# Patient Record
Sex: FEMALE | Employment: OTHER | URBAN - METROPOLITAN AREA
[De-identification: names, ages, dates, MRNs, and addresses within clinical notes are randomized per-mention and may not be internally consistent; named-entity substitution may affect disease eponyms.]

---

## 2017-01-24 ENCOUNTER — OFFICE VISIT (OUTPATIENT)
Dept: FAMILY MEDICINE | Facility: CLINIC | Age: 27
End: 2017-01-24

## 2017-01-24 VITALS
RESPIRATION RATE: 14 BRPM | HEIGHT: 61 IN | HEART RATE: 90 BPM | WEIGHT: 165.2 LBS | OXYGEN SATURATION: 99 % | BODY MASS INDEX: 31.19 KG/M2 | DIASTOLIC BLOOD PRESSURE: 81 MMHG | TEMPERATURE: 98.4 F | SYSTOLIC BLOOD PRESSURE: 110 MMHG

## 2017-01-24 DIAGNOSIS — Z00.00 ROUTINE GENERAL MEDICAL EXAMINATION AT A HEALTH CARE FACILITY: Primary | ICD-10-CM

## 2017-01-24 DIAGNOSIS — E04.9 ENLARGEMENT OF THYROID: ICD-10-CM

## 2017-01-24 LAB
BUN SERPL-MCNC: 11.1 MG/DL (ref 7–19)
CALCIUM SERPL-MCNC: 9.3 MG/DL (ref 8.5–10.1)
CHLORIDE SERPLBLD-SCNC: 102.3 MMOL/L (ref 98–110)
CHOLEST SERPL-MCNC: 214.5 MG/DL (ref 0–200)
CHOLEST/HDLC SERPL: 4 {RATIO} (ref 0–5)
CO2 SERPL-SCNC: 22.5 MMOL/L (ref 20–32)
CREAT SERPL-MCNC: 0.6 MG/DL (ref 0.5–1)
GFR SERPL CREATININE-BSD FRML MDRD: 127.5 ML/MIN/1.7 M2
GLUCOSE SERPL-MCNC: 101 MG'DL (ref 70–99)
HBA1C MFR BLD: 5.5 % (ref 4.1–5.7)
HCT VFR BLD AUTO: 45 % (ref 35–47)
HDLC SERPL-MCNC: 53.9 MG/DL
HEMOGLOBIN: 14.5 G/DL (ref 11.7–15.7)
LDLC SERPL CALC-MCNC: 138 MG/DL (ref 0–129)
MCH RBC QN AUTO: 28.2 PG (ref 26.5–35)
MCHC RBC AUTO-ENTMCNC: 32.2 G/DL (ref 32–36)
MCV RBC AUTO: 87.6 FL (ref 78–100)
PLATELET # BLD AUTO: 260 K/UL (ref 150–450)
POTASSIUM SERPL-SCNC: 3.7 MMOL/DL (ref 3.3–4.5)
RBC # BLD AUTO: 5.14 M/UL (ref 3.8–5.2)
SODIUM SERPL-SCNC: 135.3 MMOL/L (ref 132.6–141.4)
TRIGL SERPL-MCNC: 111.6 MG/DL (ref 0–150)
TSH SERPL DL<=0.005 MIU/L-ACNC: 1.69 MU/L (ref 0.4–4)
VLDL CHOLESTEROL: 22.3 MG/DL (ref 7–32)
WBC # BLD AUTO: 4.6 K/UL (ref 4–11)

## 2017-01-24 NOTE — PROGRESS NOTES
Female Physical Note          HPI         Concerns today:   Bump: Reports she was bump on her neck for the past 4 days. Denies any pain or discomfort. She is concern if this cancer or anything dangerous. Denies any family history of thyroid cancer or thyroid disorder    There is no problem list on file for this patient.      History reviewed. No pertinent past medical history.    Previous Medical Care: [facility]      History reviewed. No pertinent family history.           Review of Systems:     Review of Systems:  CONSTITUTIONAL: NEGATIVE for fever, chills, change in weight  INTEGUMENTARY/SKIN: NEGATIVE for worrisome rashes, moles or lesions  EYES: NEGATIVE for vision changes or irritation  ENT/MOUTH: NEGATIVE for ear, mouth and throat problems  RESP: NEGATIVE for significant cough or SOB  BREAST: NEGATIVE for masses, tenderness or discharge  CV: NEGATIVE for chest pain, palpitations or peripheral edema  GI: NEGATIVE for nausea, abdominal pain, heartburn, or change in bowel habits  : NEGATIVE for frequency, dysuria, or hematuria  MUSCULOSKELETAL: NEGATIVE for significant arthralgias or myalgia  NEURO: NEGATIVE for weakness, dizziness or paresthesias  ENDOCRINE: NEGATIVE for temperature intolerance, skin/hair changes  HEME/ALLERGY: NEGATIVE for bleeding problems  PSYCHIATRIC: NEGATIVE for changes in mood or affect    Sleep:   Do you snore most or the night (as reported by a family member)? No  Do you feel sleepy or extremely tired during most of the day? No           Social History     Social History     Social History     Marital Status:      Spouse Name: N/A     Number of Children: N/A     Years of Education: N/A     Occupational History     Not on file.     Social History Main Topics     Smoking status: Never Smoker      Smokeless tobacco: Not on file     Alcohol Use: No     Drug Use: No     Sexual Activity: Not Currently     Other Topics Concern     Not on file     Social History Narrative  "    No narrative on file       Marital Status:  Who lives in your household? Lives with her two children    Has anyone hurt you physically, for example by pushing, hitting, slapping or kicking you or forcing you to have sex? Denies  Do you feel threatened or controlled by a partner, ex-partner or anyone in your life? Denies    Sexual Health     Sexual concerns: No   STI History: Neg  Pregnancy History: No obstetric history on file.  LMP Patient's last menstrual period was 2017. Menses: q 28 days  Lastin days,  Normal  Last Pap Smear Date: No results found for this basename: PAP  Abnormal Pap History: None    Recommended Screening     None          Physical Exam:     Vitals: /81 mmHg  Pulse 90  Temp(Src) 98.4  F (36.9  C) (Oral)  Resp 14  Ht 5' 0.63\" (154 cm)  Wt 165 lb 3.2 oz (74.934 kg)  BMI 31.60 kg/m2  SpO2 99%  LMP 2017  BMI= Body mass index is 31.6 kg/(m^2).   GENERAL: healthy, alert and no distress  EYES: Eyes grossly normal to inspection, extraocular movements - intact, and PERRL  HENT: ear canals- normal; TMs- normal; Nose- normal; Mouth- no ulcers, no lesions  NECK: no adenopathy and thyromegaly approximately 2 times normal  RESP: lungs clear to auscultation - no rales, no rhonchi, no wheezes  BREAST: no masses, no tenderness, no nipple discharge, no palpable axillary masses or adenopathy  CV: regular rates and rhythm, normal S1 S2, no S3 or S4 and no murmur, no click or rub -  ABDOMEN: soft, no tenderness, no  hepatosplenomegaly, no masses, normal bowel sounds  MS: extremities- no gross deformities noted, no edema  SKIN: no suspicious lesions, no rashes  NEURO: strength and tone- normal, sensory exam- grossly normal, mentation- intact, speech- normal, reflexes- symmetric  BACK: no CVA tenderness, no paralumbar tenderness  - female: cervix- normal, adnexae- normal; uterus- normal, no masses, no discharge  RECTAL- female: no masses, no hemorrhoids  PSYCH: Alert and " oriented times 3; speech- coherent , normal rate and volume; able to articulate logical thoughts, able to abstract reason, no tangential thoughts, no hallucinations or delusions, affect- normal  LYMPHATICS: ant. cervical- normal, post. cervical- normal, axillary- normal, supraclavicular- normal, inguinal- normal      Assessment and Plan      Milagro was seen today for physical and throat problem.    Diagnoses and all orders for this visit:    Routine general medical examination at a health care facility  -     Hemoglobin A1c (LabDAQ)  -     TSH with free T4 reflex  -     CBC with Plt (LabDAQ)  -     Lipid Panel (LabDAQ)  -     Basic Metabolic Panel (LabDAQ)    Enlargement of thyroid  -     US Head Neck Soft Tissue; Future        Options for treatment and follow-up care were reviewed with the patient . Milgaro Akin and/or guardian engaged in the decision making process and verbalized understanding of the options discussed and agreed with the final plan.    Dane Darling MD  PGY 3 [facility]  [phone](pg)

## 2017-01-24 NOTE — NURSING NOTE
name: Za Lynch  Language: Colombian  Agency: [employer]  Phone number: [phone]      TRACY Webster

## 2017-01-24 NOTE — MR AVS SNAPSHOT
After Visit Summary   1/24/2017    Milagro Gerardo    MRN: [id number]           Patient Information     Date Of Birth          1990        Visit Information        Provider Department      1/24/2017 1:40 PM Dane Darling MD [facility]        Today's Diagnoses     Routine general medical examination at a health care facility    -  1     Enlargement of thyroid           Care Instructions    Here is the plan from today's visit    1. Routine general medical examination at a health care facility  - Hemoglobin A1c (LabDAQ)  - TSH with free T4 reflex  - CBC with Plt (LabDAQ)  - Lipid Panel (LabDAQ)  - Basic Metabolic Panel (LabDAQ)    2. Enlargement of thyroid  - US Head Neck Soft Tissue; Future        Please call or return to clinic if your symptoms don't go away.    Follow up plan  Follow up as needed    Thank you for coming to [facility] today.  Lab Testing:  **If you had lab testing today and your results are reassuring or normal they will be mailed to you or sent through [employer] within 7 days.   **If the lab tests need quick action we will call you with the results.  The phone number we will call with results is # [phone] (home) . If this is not the best number please call our clinic and change the number.  Medication Refills:  If you need any refills please call your pharmacy and they will contact us.   If you need to  your refill at a new pharmacy, please contact the new pharmacy directly. The new pharmacy will help you get your medications transferred faster.   Scheduling:  If you have any concerns about today's visit or wish to schedule another appointment please call our office during normal business hours [phone] (8-5:00 M-F)  If a referral was made to a [facility] and you don't get a call from central scheduling please call [phone].  If a Mammogram was ordered for you at [facility] call [phone] to schedule or  change your appointment.  If you had an XRay/CT/Ultrasound/MRI ordered the number is [phone] to schedule or change your radiology appointment.   Medical Concerns:  If you have urgent medical concerns please call [phone] at any time of the day.  If you have a medical emergency please call 151.    Preventive Health Recommendations  Female Ages 26 - 39  Yearly exam:   See your health care provider every year in order to    Review health changes.     Discuss preventive care.      Review your medicines if you your doctor has prescribed any.    Until age 30: Get a Pap test every three years (more often if you have had an abnormal result).    After age 30: Talk to your doctor about whether you should have a Pap test every 3 years or have a Pap test with HPV screening every 5 years.   You do not need a Pap test if your uterus was removed (hysterectomy) and you have not had cancer.  You should be tested each year for STDs (sexually transmitted diseases), if you're at risk.   Talk to your provider about how often to have your cholesterol checked.  If you are at risk for diabetes, you should have a diabetes test (fasting glucose).  Shots: Get a flu shot each year. Get a tetanus shot every 10 years.   Nutrition:     Eat at least 5 servings of fruits and vegetables each day.    Eat whole-grain bread, whole-wheat pasta and brown rice instead of white grains and rice.    Talk to your provider about Calcium and Vitamin D.     Lifestyle    Exercise at least 150 minutes a week (30 minutes a day, 5 days of the week). This will help you control your weight and prevent disease.    Limit alcohol to one drink per day.    No smoking.     Wear sunscreen to prevent skin cancer.    See your dentist every six months for an exam and cleaning.            Follow-ups after your visit        Future tests that were ordered for you today     Open Future Orders        Priority Expected Expires Ordered    US Head Neck Soft Tissue Routine   "2018            Who to contact     Please call your clinic at [phone] to:    Ask questions about your health    Make or cancel appointments    Discuss your medicines    Learn about your test results    Speak to your doctor   If you have compliments or concerns about an experience at your clinic, or if you wish to file a complaint, please contact [facility] Patient Relations at [phone] or email us at [email or web address]         Additional Information About Your Visit        Gal[employer] Digital Information     [employer] is an electronic gateway that provides easy, online access to your medical records. With [employer], you can request a clinic appointment, read your test results, renew a prescription or communicate with your care team.     To sign up for [employer] visit the website at [email or web address]   You will be asked to enter the access code listed below, as well as some personal information. Please follow the directions to create your username and password.     Your access code is: [email or web address]  Expires: 2017  2:28 PM     Your access code will  in 90 days. If you need help or a new code, please contact your [facility] or call [phone] for assistance.        Care EveryWhere ID     This is your Care EveryWhere ID. This could be used by other organizations to access your [place] medical records  [id number]        Your Vitals Were     Pulse Temperature Respirations Height BMI (Body Mass Index) Pulse Oximetry    90 98.4  F (36.9  C) (Oral) 14 5' 0.63\" (154 cm) 31.60 kg/m2 99%    Last Period                   2017            Blood Pressure from Last 3 Encounters:   17 110/81    Weight from Last 3 Encounters:   17 165 lb 3.2 oz (74.934 kg)              We Performed the Following     Basic Metabolic Panel (LabDAQ)     CBC with Plt (LabDAQ)     Hemoglobin A1c (LabDAQ)     Lipid Panel (LabDAQ)     TSH " with free T4 reflex        Primary Care Provider Office Phone # Fax #    Dane Darling [phone]       [facility] [address]  [facility] [address]        Thank you!     Thank you for choosing [facility]  for your care. Our goal is always to provide you with excellent care. Hearing back from our patients is one way we can continue to improve our services. Please take a few minutes to complete the written survey that you may receive in the mail after your visit with us. Thank you!             Your Updated Medication List - Protect others around you: Learn how to safely use, store and throw away your medicines at www.disposemymeds.org.      Notice  As of 1/24/2017  2:28 PM    You have not been prescribed any medications.

## 2017-01-24 NOTE — PROGRESS NOTES
Preceptor Attestation:   Patient seen and discussed with the resident. Assessment and plan reviewed with resident and agreed upon.   Supervising Physician:  Ysabel Navarro MD  [facility]

## 2017-01-25 ENCOUNTER — HOSPITAL ENCOUNTER (OUTPATIENT)
Dept: ULTRASOUND IMAGING | Facility: CLINIC | Age: 27
Discharge: HOME | End: 2017-01-25
Attending: FAMILY MEDICINE | Admitting: FAMILY MEDICINE
Payer: COMMERCIAL

## 2017-01-25 ENCOUNTER — TELEPHONE (OUTPATIENT)
Dept: FAMILY MEDICINE | Facility: CLINIC | Age: 27
End: 2017-01-25

## 2017-01-25 DIAGNOSIS — E04.9 ENLARGEMENT OF THYROID: ICD-10-CM

## 2017-01-25 PROCEDURE — 76536 US EXAM OF HEAD AND NECK: CPT

## 2017-01-25 NOTE — TELEPHONE ENCOUNTER
Please call the patient to schedule an appointment to discuss lab results and ultrasound results.      Dane Darling MD  PGY 3 [facility]  [phone](pg)

## 2017-01-26 NOTE — TELEPHONE ENCOUNTER
Called and spoke with patient. Informed patient that Dr. Darling does not have appointment open today but that she could see Dr. Darling tomorrow or a different provider could see her today. Patient stated she will think about it and call back today to schedule.

## 2017-01-27 ENCOUNTER — OFFICE VISIT (OUTPATIENT)
Dept: FAMILY MEDICINE | Facility: CLINIC | Age: 27
End: 2017-01-27

## 2017-01-27 VITALS
DIASTOLIC BLOOD PRESSURE: 82 MMHG | TEMPERATURE: 98.2 F | WEIGHT: 167.4 LBS | RESPIRATION RATE: 20 BRPM | HEART RATE: 8 BPM | BODY MASS INDEX: 32.02 KG/M2 | SYSTOLIC BLOOD PRESSURE: 117 MMHG | OXYGEN SATURATION: 100 %

## 2017-01-27 DIAGNOSIS — E04.2 NONTOXIC MULTINODULAR GOITER: Primary | ICD-10-CM

## 2017-01-27 DIAGNOSIS — E78.5 HYPERLIPIDEMIA LDL GOAL <100: ICD-10-CM

## 2017-01-27 RX ORDER — ACETAMINOPHEN 160 MG
2000 TABLET,DISINTEGRATING ORAL
COMMUNITY
Start: 2014-05-16

## 2017-01-27 ASSESSMENT — ENCOUNTER SYMPTOMS
FATIGUE: 0
SHORTNESS OF BREATH: 0
FEVER: 0
ENDOCRINE COMMENTS: THYROID NODULES
COUGH: 0

## 2017-01-27 NOTE — MR AVS SNAPSHOT
After Visit Summary   1/27/2017    Milagro Gerardo    MRN: [id number]           Patient Information     Date Of Birth          1990        Visit Information        Provider Department      1/27/2017 9:00 AM Dane Darling MD [facility]        Today's Diagnoses     Nontoxic multinodular goiter    -  1     Hyperlipidemia LDL goal <100           Care Instructions    Here is the plan from today's visit    1. Nontoxic multinodular goiter  -Schedule an appointment with endocrinology and will need possible fine needle biopsy   - ENDOCRINOLOGY ADULT REFERRAL - INTERNAL    2. Hyperlipidemia LDL goal <100  _Encouarged lifestyle modifications such as exercise and diet       Please call or return to clinic if your symptoms don't go away.    Follow up plan  Follow up as needed    Thank you for coming to [facility] today.  Lab Testing:  **If you had lab testing today and your results are reassuring or normal they will be mailed to you or sent through [employer] within 7 days.   **If the lab tests need quick action we will call you with the results.  The phone number we will call with results is # [phone] (home) . If this is not the best number please call our clinic and change the number.  Medication Refills:  If you need any refills please call your pharmacy and they will contact us.   If you need to  your refill at a new pharmacy, please contact the new pharmacy directly. The new pharmacy will help you get your medications transferred faster.   Scheduling:  If you have any concerns about today's visit or wish to schedule another appointment please call our office during normal business hours [phone] (8-5:00 M-F)  If a referral was made to a [facility] and you don't get a call from central scheduling please call [phone].  If a Mammogram was ordered for you at [facility] call [phone] to schedule or change your appointment.  If you  had an XRay/CT/Ultrasound/MRI ordered the number is [phone] to schedule or change your radiology appointment.   Medical Concerns:  If you have urgent medical concerns please call [phone] at any time of the day.  If you have a medical emergency please call 911.        Follow-ups after your visit        Additional Services     ENDOCRINOLOGY ADULT REFERRAL - INTERNAL       Your provider has referred you to: [facility]: Endocrinology and Diabetes Clinic [facility] [phone]   [email or web address] Please schedule an appointment with Dr. Katarzyna Zabala      Please be aware that coverage of these services is subject to the terms and limitations of your health insurance plan.  Call member services at your health plan with any benefit or coverage questions.      Please bring the following to your appointment:    >>   Any x-rays, CTs or MRIs which have been performed.  Contact the facility where they were done to arrange for  prior to your scheduled appointment.    >>   List of current medications   >>   This referral request   >>   Any documents/labs given to you for this referral                  Who to contact     Please call your clinic at [phone] to:    Ask questions about your health    Make or cancel appointments    Discuss your medicines    Learn about your test results    Speak to your doctor   If you have compliments or concerns about an experience at your clinic, or if you wish to file a complaint, please contact [facility] Patient Relations at [phone] or email us at [email or web address]         Additional Information About Your Visit        Mercorahart Information     [employer] is an electronic gateway that provides easy, online access to your medical records. With [employer], you can request a clinic appointment, read your test results, renew a prescription or communicate with your care team.     To sign up  for MyChart visit the website at [email or web address]   You will be asked to enter the access code listed below, as well as some personal information. Please follow the directions to create your username and password.     Your access code is: [email or web address]  Expires: 2017  2:28 PM     Your access code will  in 90 days. If you need help or a new code, please contact your [facility] or call [phone] for assistance.        Care EveryWhere ID     This is your Care EveryWhere ID. This could be used by other organizations to access your [place] medical records  [id number]        Your Vitals Were     Pulse Temperature Respirations Pulse Oximetry Last Period       8 98.2  F (36.8  C) (Oral) 20 100% 2017        Blood Pressure from Last 3 Encounters:   17 117/82   17 110/81    Weight from Last 3 Encounters:   17 167 lb 6.4 oz (75.932 kg)   17 165 lb 3.2 oz (74.934 kg)              We Performed the Following     ENDOCRINOLOGY ADULT REFERRAL - INTERNAL        Primary Care Provider Office Phone # Fax #    [facility] [phone]       [address] [facility] [address]        Thank you!     Thank you for choosing [facility]  for your care. Our goal is always to provide you with excellent care. Hearing back from our patients is one way we can continue to improve our services. Please take a few minutes to complete the written survey that you may receive in the mail after your visit with us. Thank you!             Your Updated Medication List - Protect others around you: Learn how to safely use, store and throw away your medicines at www.disposemymeds.org.          This list is accurate as of: 17  9:51 AM.  Always use your most recent med list.                   Brand Name Dispense Instructions for use    vitamin D3 2000 UNITS Caps      Take 2,000 Units by mouth

## 2017-01-27 NOTE — PROGRESS NOTES
Preceptor Attestation:   Patient seen and discussed with the resident.   Assessment and plan reviewed with resident and agreed upon.   Supervising Physician:  Virgilio Mejia MD  [facility]

## 2017-01-27 NOTE — PATIENT INSTRUCTIONS
Here is the plan from today's visit    1. Nontoxic multinodular goiter  -Schedule an appointment with endocrinology and will need possible fine needle biopsy   - ENDOCRINOLOGY ADULT REFERRAL - INTERNAL    2. Hyperlipidemia LDL goal <100  _Encouarged lifestyle modifications such as exercise and diet       Please call or return to clinic if your symptoms don't go away.    Follow up plan  Follow up as needed    Thank you for coming to [facility] today.  Lab Testing:  **If you had lab testing today and your results are reassuring or normal they will be mailed to you or sent through [employer] within 7 days.   **If the lab tests need quick action we will call you with the results.  The phone number we will call with results is # [phone] (home) . If this is not the best number please call our clinic and change the number.  Medication Refills:  If you need any refills please call your pharmacy and they will contact us.   If you need to  your refill at a new pharmacy, please contact the new pharmacy directly. The new pharmacy will help you get your medications transferred faster.   Scheduling:  If you have any concerns about today's visit or wish to schedule another appointment please call our office during normal business hours [phone] (8-5:00 M-F)  If a referral was made to a [facility] and you don't get a call from central scheduling please call [phone].  If a Mammogram was ordered for you at [facility] call [phone] to schedule or change your appointment.  If you had an XRay/CT/Ultrasound/MRI ordered the number is [phone] to schedule or change your radiology appointment.   Medical Concerns:  If you have urgent medical concerns please call [phone] at any time of the day.  If you have a medical emergency please call [phone].

## 2017-01-27 NOTE — PROGRESS NOTES
HPI:       Milagro Gerardo is a 26 year old who presents for the following  Patient presents with:  RECHECK: pt requesting lab results, follow up     Lab results: Patient is here to discuss lab result. She had neck ultrasound done on Jan 25th, 2017 which showed heterogenous multinodular thyroid. TSH is within normal. Patient is very tearful and worries this could be malignancy. Labs showed elevated cholesterol(214) and elevated LDH(138)      Problem, Medication and Allergy Lists were reviewed and are current.  Patient is an established patient of this clinic.         Review of Systems:   Review of Systems   Constitutional: Negative for fever and fatigue.   Respiratory: Negative for cough and shortness of breath.    Endocrine:        Thyroid nodules    Psychiatric/Behavioral:        Tearful             Physical Exam:     Patient Vitals for the past 24 hrs:   BP Temp Temp src Pulse Resp SpO2 Weight   01/27/17 0906 117/82 mmHg 98.2  F (36.8  C) Oral (!) 8 20 100 % 167 lb 6.4 oz (75.932 kg)     Body mass index is 32.02 kg/(m^2).  Vitals were reviewed and were normal     Physical Exam   Constitutional: She is oriented to person, place, and time. She appears well-developed and well-nourished. No distress.   HENT:   Head: Normocephalic and atraumatic.   Neurological: She is alert and oriented to person, place, and time.   Psychiatric: Her mood appears anxious.   Tearful       Results:      Results from the last 24 hoursNo results found for this or any previous visit (from the past 24 hour(s)).  Assessment and Plan     Patient Instructions   Here is the plan from today's visit    1. Nontoxic multinodular goiter  -Reviewed with the patient that nodule could benign vs malignancy   -Schedule an appointment with endocrinology and will need possible fine needle aspiration   - ENDOCRINOLOGY ADULT REFERRAL - INTERNAL    2. Hyperlipidemia LDL goal <100  -Encouarged lifestyle modifications such as exercise and diet       Please  call or return to clinic if your symptoms don't go away.    Follow up plan  Follow up as needed      There are no discontinued medications.  Options for treatment and follow-up care were reviewed with the patient. Milagro Gerardo  engaged in the decision making process and verbalized understanding of the options discussed and agreed with the final plan.    Dane Darling MD  PGY 3 [facility]  [phone](pg)

## 2022-06-13 NOTE — PATIENT INSTRUCTIONS
Here is the plan from today's visit    1. Routine general medical examination at a health care facility  - Hemoglobin A1c ([facility])  - TSH with free T4 reflex  - CBC with Plt ([facility])  - Lipid Panel ([facility])  - Basic Metabolic Panel ([facility])    2. Enlargement of thyroid  - US Head Neck Soft Tissue; Future        Please call or return to clinic if your symptoms don't go away.    Follow up plan  Follow up as needed    Thank you for coming to [facility] today.  Lab Testing:  **If you had lab testing today and your results are reassuring or normal they will be mailed to you or sent through [employer] within 7 days.   **If the lab tests need quick action we will call you with the results.  The phone number we will call with results is # [phone] (home) . If this is not the best number please call our clinic and change the number.  Medication Refills:  If you need any refills please call your pharmacy and they will contact us.   If you need to  your refill at a new pharmacy, please contact the new pharmacy directly. The new pharmacy will help you get your medications transferred faster.   Scheduling:  If you have any concerns about today's visit or wish to schedule another appointment please call our office during normal business hours [phone] (8-5:00 M-F)  If a referral was made to a [facility] and you don't get a call from central scheduling please call [phone].  If a Mammogram was ordered for you at [facility] call [phone] to schedule or change your appointment.  If you had an XRay/CT/Ultrasound/MRI ordered the number is [phone] to schedule or change your radiology appointment.   Medical Concerns:  If you have urgent medical concerns please call [phone] at any time of the day.  If you have a medical emergency please call [phone].    Preventive Health Recommendations  Female Ages 26 - 39  Yearly exam:   See your health care provider every year in order  no to    Review health changes.     Discuss preventive care.      Review your medicines if you your doctor has prescribed any.    Until age 30: Get a Pap test every three years (more often if you have had an abnormal result).    After age 30: Talk to your doctor about whether you should have a Pap test every 3 years or have a Pap test with HPV screening every 5 years.   You do not need a Pap test if your uterus was removed (hysterectomy) and you have not had cancer.  You should be tested each year for STDs (sexually transmitted diseases), if you're at risk.   Talk to your provider about how often to have your cholesterol checked.  If you are at risk for diabetes, you should have a diabetes test (fasting glucose).  Shots: Get a flu shot each year. Get a tetanus shot every 10 years.   Nutrition:     Eat at least 5 servings of fruits and vegetables each day.    Eat whole-grain bread, whole-wheat pasta and brown rice instead of white grains and rice.    Talk to your provider about Calcium and Vitamin D.     Lifestyle    Exercise at least 150 minutes a week (30 minutes a day, 5 days of the week). This will help you control your weight and prevent disease.    Limit alcohol to one drink per day.    No smoking.     Wear sunscreen to prevent skin cancer.    See your dentist every six months for an exam and cleaning.

## 2024-09-27 ENCOUNTER — OFFICE VISIT (OUTPATIENT)
Dept: FAMILY MEDICINE | Facility: CLINIC | Age: 34
End: 2024-09-27

## 2024-09-27 VITALS
HEIGHT: 61 IN | HEART RATE: 74 BPM | BODY MASS INDEX: 37.61 KG/M2 | DIASTOLIC BLOOD PRESSURE: 72 MMHG | WEIGHT: 199.2 LBS | TEMPERATURE: 97.5 F | OXYGEN SATURATION: 96 % | RESPIRATION RATE: 18 BRPM | SYSTOLIC BLOOD PRESSURE: 104 MMHG

## 2024-09-27 DIAGNOSIS — R53.83 FATIGUE, UNSPECIFIED TYPE: ICD-10-CM

## 2024-09-27 DIAGNOSIS — E03.9 HYPOTHYROIDISM, UNSPECIFIED TYPE: Primary | ICD-10-CM

## 2024-09-27 DIAGNOSIS — E55.9 VITAMIN D DEFICIENCY: ICD-10-CM

## 2024-09-27 PROBLEM — E06.3 HASHIMOTO'S DISEASE: Status: ACTIVE | Noted: 2017-05-11

## 2024-09-27 PROBLEM — O34.219 PREVIOUS CESAREAN SECTION COMPLICATING PREGNANCY: Status: ACTIVE | Noted: 2022-12-09

## 2024-09-27 PROBLEM — N73.6 PELVIC ADHESIONS: Status: ACTIVE | Noted: 2022-12-13

## 2024-09-27 PROBLEM — E04.9 THYROID ENLARGEMENT: Status: ACTIVE | Noted: 2017-04-20

## 2024-09-27 PROBLEM — B95.1 POSITIVE GBS TEST: Status: ACTIVE | Noted: 2023-03-27

## 2024-09-27 LAB
BASOPHILS # BLD AUTO: 0 10E3/UL (ref 0–0.2)
BASOPHILS NFR BLD AUTO: 1 %
EOSINOPHIL # BLD AUTO: 0.1 10E3/UL (ref 0–0.7)
EOSINOPHIL NFR BLD AUTO: 3 %
ERYTHROCYTE [DISTWIDTH] IN BLOOD BY AUTOMATED COUNT: 13.1 % (ref 10–15)
HCT VFR BLD AUTO: 45.7 % (ref 35–47)
HGB BLD-MCNC: 15.1 G/DL (ref 11.7–15.7)
IMM GRANULOCYTES # BLD: 0 10E3/UL
IMM GRANULOCYTES NFR BLD: 0 %
LYMPHOCYTES # BLD AUTO: 1.9 10E3/UL (ref 0.8–5.3)
LYMPHOCYTES NFR BLD AUTO: 39 %
MCH RBC QN AUTO: 29.7 PG (ref 26.5–33)
MCHC RBC AUTO-ENTMCNC: 33 G/DL (ref 31.5–36.5)
MCV RBC AUTO: 90 FL (ref 78–100)
MONOCYTES # BLD AUTO: 0.4 10E3/UL (ref 0–1.3)
MONOCYTES NFR BLD AUTO: 8 %
NEUTROPHILS # BLD AUTO: 2.4 10E3/UL (ref 1.6–8.3)
NEUTROPHILS NFR BLD AUTO: 49 %
PLATELET # BLD AUTO: 257 10E3/UL (ref 150–450)
RBC # BLD AUTO: 5.09 10E6/UL (ref 3.8–5.2)
WBC # BLD AUTO: 4.9 10E3/UL (ref 4–11)

## 2024-09-27 PROCEDURE — 99203 OFFICE O/P NEW LOW 30 MIN: CPT | Performed by: NURSE PRACTITIONER

## 2024-09-27 PROCEDURE — 36415 COLL VENOUS BLD VENIPUNCTURE: CPT | Performed by: NURSE PRACTITIONER

## 2024-09-27 PROCEDURE — 85025 COMPLETE CBC W/AUTO DIFF WBC: CPT | Performed by: NURSE PRACTITIONER

## 2024-09-27 PROCEDURE — 80053 COMPREHEN METABOLIC PANEL: CPT | Performed by: NURSE PRACTITIONER

## 2024-09-27 PROCEDURE — 82306 VITAMIN D 25 HYDROXY: CPT | Performed by: NURSE PRACTITIONER

## 2024-09-27 PROCEDURE — 84443 ASSAY THYROID STIM HORMONE: CPT | Performed by: NURSE PRACTITIONER

## 2024-09-27 RX ORDER — LEVOTHYROXINE SODIUM 75 UG/1
75 TABLET ORAL DAILY
COMMUNITY

## 2024-09-27 RX ORDER — OMEGA-3 FATTY ACIDS/FISH OIL 300-1000MG
2 CAPSULE ORAL
COMMUNITY

## 2024-09-27 RX ORDER — BIOTIN 1 MG
TABLET ORAL
COMMUNITY

## 2024-09-27 ASSESSMENT — PAIN SCALES - GENERAL: PAINLEVEL: NO PAIN (0)

## 2024-09-27 NOTE — PROGRESS NOTES
"  Assessment & Plan     (E03.9) Hypothyroidism, unspecified type  (primary encounter diagnosis)  Comment: The patient's symptoms of fatigue, weight gain, cold intolerance, constipation, and dry skin are consistent with hypothyroidism.  Plan: TSH with free T4 reflex to evaluate current thyroid hormone levels.  - If TSH is elevated or Free T4 is low, I will consider adjusting the levothyroxine dosage accordingly.    (R53.83) Fatigue, unspecified type  Comment: The fatigue may be related to her hypothyroid state, but other causes should be considered, including vitamin D deficiency.  Plan: Comprehensive metabolic panel (BMP + Alb, Alk         Phos, ALT, AST, Total. Bili, TP), CBC with         platelets and differential, TSH with free T4         Reflex  - Discussed lifestyle modifications, including regular exercise and a balanced diet, to help manage fatigue.  - Encouraged the patient to monitor her symptoms and report any changes.    (E55.9) Vitamin D deficiency  Comment: The patient has a history of vitamin D deficiency and is requesting to check her vitamin D levels.  Plan: Vitamin D Deficiency  to assess current vitamin D status.  - If levels are low, discuss supplementation options and dietary sources of vitamin D.               BMI  Estimated body mass index is 38.1 kg/m  as calculated from the following:    Height as of this encounter: 1.54 m (5' 0.63\").    Weight as of this encounter: 90.4 kg (199 lb 3.2 oz).             Tony Humphrey is a 34 year old, presenting for the following health issues:  Thyroid Problem (Patient would like to check her thyroid today.)        9/27/2024     3:38 PM   Additional Questions   Roomed by Minna Sanderson MA   Accompanied by Self     History of Present Illness       Reason for visit:  Check my health   She is taking medications regularly.     Milagro Gerardo is  a 34-year-old female with a known history of hypothyroidism and Hashimoto's disease, for which she is currently " "taking levothyroxine. She reports being compliant with her medication regimen and denies experiencing any side effects, such as palpitations. However, she presents with complaints of persistent fatigue that has been ongoing for the past few months.   In addition to fatigue, she notes experiencing weight gain, cold intolerance, constipation, and dry skin. The patient denies any associated symptoms such as shortness of breath, chest pain, palpitations, melena, or blood in her stool. She reports that she does not skip meals, sleeps for more than six hours each night, and does not feel overworked. Furthermore, she denies any significant stressors in her life at this time.     The patient also has a history of vitamin D deficiency and has requested that her vitamin D levels be checked during this visit.                     Review of Systems  Constitutional, HEENT, cardiovascular, pulmonary, GI, , musculoskeletal, neuro, skin, endocrine and psych systems are negative, except as otherwise noted.      Objective    /72 (BP Location: Left arm, Patient Position: Chair, Cuff Size: Adult Regular)   Pulse 74   Temp 97.5  F (36.4  C) (Temporal)   Resp 18   Ht 1.54 m (5' 0.63\")   Wt 90.4 kg (199 lb 3.2 oz)   LMP 09/09/2024 (Approximate)   SpO2 96%   BMI 38.10 kg/m    Body mass index is 38.1 kg/m .  Physical Exam   GENERAL: alert and no distress  EYES: Eyes grossly normal to inspection, PERRL and conjunctivae and sclerae normal  NECK: no adenopathy, no asymmetry, masses, or scars, thyromegaly  RESP: lungs clear to auscultation - no rales, rhonchi or wheezes  CV: regular rate and rhythm, normal S1 S2, no S3 or S4, no murmur, click or rub, no peripheral edema  ABDOMEN: soft, nontender, no hepatosplenomegaly, no masses and bowel sounds normal  MS: no gross musculoskeletal defects noted, no edema  SKIN: no suspicious lesions or rashes  NEURO: Normal strength and tone, mentation intact and speech normal  PSYCH: mentation " appears normal, affect normal/bright            Signed Electronically by: ARIELLA Youngblood CNP

## 2024-09-27 NOTE — LETTER
October 2, 2024      Milagro Gerardo  [address]   [place] [facility] [address]        Dear ,    We are writing to inform you of your test results. Your TSH indicates that your thyroid function is currently in balance.  Continue same dose of levothyroxine Your kidney function was normal.   Your blood count is normal.  There is no anemia or abnormalities suggesting infection or other problems. All of the rest of your test results were within the expected range for you.     Please contact the clinic if you have additional questions.  Thank you.   Resulted Orders   Comprehensive metabolic panel (BMP + Alb, Alk Phos, ALT, AST, Total. Bili, TP)   Result Value Ref Range    Sodium 138 135 - 145 mmol/L    Potassium 3.9 3.4 - 5.3 mmol/L    Carbon Dioxide (CO2) 22 22 - 29 mmol/L    Anion Gap 14 7 - 15 mmol/L    Urea Nitrogen 15.5 6.0 - 20.0 mg/dL    Creatinine 0.78 0.51 - 0.95 mg/dL    GFR Estimate >90 >60 mL/min/1.73m2      Comment:      eGFR calculated using 2021 CKD-EPI equation.    Calcium 8.9 8.8 - 10.4 mg/dL      Comment:      Reference intervals for this test were updated on 7/16/2024 to reflect our healthy population more accurately. There may be differences in the flagging of prior results with similar values performed with this method. Those prior results can be interpreted in the context of the updated reference intervals.    Chloride 102 98 - 107 mmol/L    Glucose 118 (H) 70 - 99 mg/dL    Alkaline Phosphatase 117 40 - 150 U/L    AST 20 0 - 45 U/L    ALT 19 0 - 50 U/L    Protein Total 7.1 6.4 - 8.3 g/dL    Albumin 4.0 3.5 - 5.2 g/dL    Bilirubin Total 0.2 <=1.2 mg/dL   TSH with free T4 reflex   Result Value Ref Range    TSH 2.37 0.30 - 4.20 uIU/mL   Vitamin D Deficiency   Result Value Ref Range    Vitamin D, Total (25-Hydroxy) 35 20 - 50 ng/mL      Comment:      optimum levels    Narrative    Season, race, dietary intake, and treatment affect the concentration of 25-hydroxy-Vitamin D.  Values may decrease during winter months and increase during summer months.    Vitamin D determination is routinely performed by an immunoassay specific for 25 hydroxyvitamin D3.  If an individual is on vitamin D2(ergocalciferol) supplementation, please specify 25 OH vitamin D2 and D3 level determination by LCMSMS test VITD23.     CBC with platelets and differential   Result Value Ref Range    WBC Count 4.9 4.0 - 11.0 10e3/uL    RBC Count 5.09 3.80 - 5.20 10e6/uL    Hemoglobin 15.1 11.7 - 15.7 g/dL    Hematocrit 45.7 35.0 - 47.0 %    MCV 90 78 - 100 fL    MCH 29.7 26.5 - 33.0 pg    MCHC 33.0 31.5 - 36.5 g/dL    RDW 13.1 10.0 - 15.0 %    Platelet Count 257 150 - 450 10e3/uL    % Neutrophils 49 %    % Lymphocytes 39 %    % Monocytes 8 %    % Eosinophils 3 %    % Basophils 1 %    % Immature Granulocytes 0 %    Absolute Neutrophils 2.4 1.6 - 8.3 10e3/uL    Absolute Lymphocytes 1.9 0.8 - 5.3 10e3/uL    Absolute Monocytes 0.4 0.0 - 1.3 10e3/uL    Absolute Eosinophils 0.1 0.0 - 0.7 10e3/uL    Absolute Basophils 0.0 0.0 - 0.2 10e3/uL    Absolute Immature Granulocytes 0.0 <=0.4 10e3/uL   If you have any questions or concerns, please call the clinic at the number listed above.       Sincerely,      ARIELLA Youngblood CNP

## 2024-09-30 PROBLEM — R53.83 FATIGUE, UNSPECIFIED TYPE: Status: ACTIVE | Noted: 2024-09-30

## 2024-09-30 LAB
ALBUMIN SERPL BCG-MCNC: 4 G/DL (ref 3.5–5.2)
ALP SERPL-CCNC: 117 U/L (ref 40–150)
ALT SERPL W P-5'-P-CCNC: 19 U/L (ref 0–50)
ANION GAP SERPL CALCULATED.3IONS-SCNC: 14 MMOL/L (ref 7–15)
AST SERPL W P-5'-P-CCNC: 20 U/L (ref 0–45)
BILIRUB SERPL-MCNC: 0.2 MG/DL
BUN SERPL-MCNC: 15.5 MG/DL (ref 6–20)
CALCIUM SERPL-MCNC: 8.9 MG/DL (ref 8.8–10.4)
CHLORIDE SERPL-SCNC: 102 MMOL/L (ref 98–107)
CREAT SERPL-MCNC: 0.78 MG/DL (ref 0.51–0.95)
EGFRCR SERPLBLD CKD-EPI 2021: >90 ML/MIN/1.73M2
GLUCOSE SERPL-MCNC: 118 MG/DL (ref 70–99)
HCO3 SERPL-SCNC: 22 MMOL/L (ref 22–29)
POTASSIUM SERPL-SCNC: 3.9 MMOL/L (ref 3.4–5.3)
PROT SERPL-MCNC: 7.1 G/DL (ref 6.4–8.3)
SODIUM SERPL-SCNC: 138 MMOL/L (ref 135–145)
TSH SERPL DL<=0.005 MIU/L-ACNC: 2.37 UIU/ML (ref 0.3–4.2)
VIT D+METAB SERPL-MCNC: 35 NG/ML (ref 20–50)

## 2024-10-02 NOTE — RESULT ENCOUNTER NOTE
Ms. Gerardo,    Your TSH indicates that your thyroid function is currently in balance.  Continue same dose of levothyroxine  Your kidney function was normal.  Your blood count is normal.  There is no anemia or abnormalities suggesting infection or other problems.  All of the rest of your test results were within the expected range for you.    Please contact the clinic if you have additional questions.  Thank you.    Sincerely,    ARIELLA Youngblood CNP